# Patient Record
Sex: FEMALE | Race: ASIAN | URBAN - METROPOLITAN AREA
[De-identification: names, ages, dates, MRNs, and addresses within clinical notes are randomized per-mention and may not be internally consistent; named-entity substitution may affect disease eponyms.]

---

## 2022-06-28 ENCOUNTER — OFFICE VISIT (OUTPATIENT)
Dept: URBAN - METROPOLITAN AREA CLINIC 96 | Facility: CLINIC | Age: 27
End: 2022-06-28
Payer: COMMERCIAL

## 2022-06-28 VITALS
WEIGHT: 141 LBS | BODY MASS INDEX: 23.49 KG/M2 | TEMPERATURE: 98.2 F | SYSTOLIC BLOOD PRESSURE: 110 MMHG | HEIGHT: 65 IN | DIASTOLIC BLOOD PRESSURE: 69 MMHG | HEART RATE: 83 BPM

## 2022-06-28 DIAGNOSIS — K60.2 ANAL FISSURE: ICD-10-CM

## 2022-06-28 DIAGNOSIS — K59.09 CHANGE IN BOWEL MOVEMENTS INTERMITTENT CONSTIPATION. URGENCY IN THE MORNING.: ICD-10-CM

## 2022-06-28 DIAGNOSIS — K62.89 RECTAL PAIN: ICD-10-CM

## 2022-06-28 DIAGNOSIS — K62.5 RECTAL BLEEDING: ICD-10-CM

## 2022-06-28 PROCEDURE — 99204 OFFICE O/P NEW MOD 45 MIN: CPT | Performed by: INTERNAL MEDICINE

## 2022-06-28 PROCEDURE — 99244 OFF/OP CNSLTJ NEW/EST MOD 40: CPT | Performed by: INTERNAL MEDICINE

## 2022-06-28 RX ORDER — BACITRACIN ZINC, NEOMYCIN SULFATE, POLYMYXIN B SULFATE 500; 3.5; 1 [IU]/G; MG/G; [IU]/G
2 TABLETS AT BEDTIME AS NEEDED OINTMENT TOPICAL ONCE A DAY
Status: ACTIVE | COMMUNITY

## 2022-06-28 NOTE — PHYSICAL EXAM GASTROINTESTINAL
Abdomen , soft, nontender, nondistended , no guarding or rigidity , no masses palpable , normal bowel sounds , Liver and Spleen,  no hepatosplenomegaly , liver nontender, rectal exam with anal fissure in psterior midline, no mass, no stool in vault

## 2022-06-28 NOTE — HPI-TODAY'S VISIT:
Patient being seen in consultation as requested by Dr. Rosanne Berg for constipation and rectal bleeding.  A copy of this document will be sent to the requesting physician.  Patient reports post partum 4/2022 vaginal delivery uncomplicated healthy girl. Reports constipation with pregnancy and was taking Colace which helped. Post partum 1 week, did not have same effect with Colace and had painful hard BM. Also noted had BRBPR with sharp rectal pain. Advised to take Miralax daily which helped with constipation. However, still with occasional rectal pain after BM, no rectal bleeding today. No prior hx of anal fissure. No prior of hemorrhoids.   No chance currently pregnant. Breast feeding.

## 2022-07-06 ENCOUNTER — OFFICE VISIT (OUTPATIENT)
Dept: URBAN - METROPOLITAN AREA TELEHEALTH 2 | Facility: TELEHEALTH | Age: 27
End: 2022-07-06

## 2022-08-01 ENCOUNTER — TELEPHONE ENCOUNTER (OUTPATIENT)
Dept: URBAN - METROPOLITAN AREA CLINIC 96 | Facility: CLINIC | Age: 27
End: 2022-08-01

## 2022-08-01 ENCOUNTER — OFFICE VISIT (OUTPATIENT)
Dept: URBAN - METROPOLITAN AREA CLINIC 98 | Facility: CLINIC | Age: 27
End: 2022-08-01
Payer: COMMERCIAL

## 2022-08-01 ENCOUNTER — WEB ENCOUNTER (OUTPATIENT)
Dept: URBAN - METROPOLITAN AREA CLINIC 98 | Facility: CLINIC | Age: 27
End: 2022-08-01

## 2022-08-01 VITALS
TEMPERATURE: 98.1 F | WEIGHT: 136.6 LBS | BODY MASS INDEX: 22.76 KG/M2 | SYSTOLIC BLOOD PRESSURE: 113 MMHG | HEART RATE: 89 BPM | HEIGHT: 65 IN | DIASTOLIC BLOOD PRESSURE: 70 MMHG

## 2022-08-01 DIAGNOSIS — K62.5 RECTAL BLEEDING: ICD-10-CM

## 2022-08-01 DIAGNOSIS — K60.2 ANAL FISSURE: ICD-10-CM

## 2022-08-01 DIAGNOSIS — K59.00 CONSTIPATION, UNSPECIFIED CONSTIPATION TYPE: ICD-10-CM

## 2022-08-01 PROCEDURE — 99213 OFFICE O/P EST LOW 20 MIN: CPT

## 2022-08-01 RX ORDER — BACITRACIN ZINC, NEOMYCIN SULFATE, POLYMYXIN B SULFATE 500; 3.5; 1 [IU]/G; MG/G; [IU]/G
2 TABLETS AT BEDTIME AS NEEDED OINTMENT TOPICAL ONCE A DAY
Status: ON HOLD | COMMUNITY

## 2022-08-01 NOTE — HPI-TODAY'S VISIT:
Patient previously seen by Dr. Leon for post partum constipatipation.  She was previously seeing BRBPR with hard stools and associated pain.  Anal fissure was noted on exam in the office in 6/2022 She was provided with nitroglycerin ointment and advised Miralax daily  Patient states she has been taking the Miralax daily which does improve stools She endorses she was having soft stools and decided to decrease Miralax to half a cap. She then developed hard stools which resulted in sharp pain with episode of BRBPR x 1-2 episodes Patient then went back to 1 full cap of Miralax daily 3 days ago. Has had soft stools since with no sharp pain Is doing warm water soaks intermittently- 1-2 times in the last month Patient states she does occasionally have dark brown stools- typically when hard. She poses concern for color  Denies unintentional weight loss  Denies fever or chills

## 2022-08-01 NOTE — PHYSICAL EXAM GASTROINTESTINAL
Abdomen , soft, nontender, nondistended , no guarding or rigidity , no masses palpable , normal bowel sounds , Liver and Spleen , no hepatomegaly present , no hepatosplenomegaly , liver nontender , spleen not palpable , Rectum skin tag noted on rectum. No fissure present

## 2022-08-29 ENCOUNTER — TELEPHONE ENCOUNTER (OUTPATIENT)
Dept: URBAN - METROPOLITAN AREA CLINIC 98 | Facility: CLINIC | Age: 27
End: 2022-08-29

## 2022-08-30 ENCOUNTER — WEB ENCOUNTER (OUTPATIENT)
Dept: URBAN - METROPOLITAN AREA CLINIC 23 | Facility: CLINIC | Age: 27
End: 2022-08-30

## 2022-08-30 ENCOUNTER — OFFICE VISIT (OUTPATIENT)
Dept: URBAN - METROPOLITAN AREA CLINIC 23 | Facility: CLINIC | Age: 27
End: 2022-08-30
Payer: COMMERCIAL

## 2022-08-30 VITALS
HEIGHT: 65 IN | SYSTOLIC BLOOD PRESSURE: 111 MMHG | WEIGHT: 134.6 LBS | TEMPERATURE: 98.1 F | HEART RATE: 79 BPM | DIASTOLIC BLOOD PRESSURE: 72 MMHG | BODY MASS INDEX: 22.42 KG/M2

## 2022-08-30 DIAGNOSIS — K59.00 CONSTIPATION: ICD-10-CM

## 2022-08-30 DIAGNOSIS — K64.8 INTERNAL HEMORRHOIDS WITH COMPLICATION: ICD-10-CM

## 2022-08-30 DIAGNOSIS — K62.89 ANAL PAIN: ICD-10-CM

## 2022-08-30 DIAGNOSIS — K60.2 ANAL FISSURE: ICD-10-CM

## 2022-08-30 PROCEDURE — 46611 ANOSCOPY: CPT | Performed by: INTERNAL MEDICINE

## 2022-08-30 PROCEDURE — 99214 OFFICE O/P EST MOD 30 MIN: CPT | Performed by: INTERNAL MEDICINE

## 2022-08-30 RX ORDER — BACITRACIN ZINC, NEOMYCIN SULFATE, POLYMYXIN B SULFATE 500; 3.5; 1 [IU]/G; MG/G; [IU]/G
2 TABLETS AT BEDTIME AS NEEDED OINTMENT TOPICAL ONCE A DAY
Status: ON HOLD | COMMUNITY

## 2022-08-30 NOTE — EXAM-PHYSICAL EXAM
ANORECTAL EXAM AND ANOSCOPY:  Perineum: Appearance and structural integrity within normal limits, no rashes or skin lesions present.  Anus: No masses present, no anal bleeding present.  Anoscopy with grade 2 hemorrhoids in right anterior and right posterior columns.  Left lateral column has smaller, grade 1 hemorrhoids.  Rectum: On digital rectal exam, there is a mildly spastic sphincter tone and a small posterior anal fissure noted.  No rectal masses present.  Both the digital rectal exam and the anoscopy caused anal discomfort for the patient.  Hence hemorrhoid banding was deferred today.

## 2022-08-30 NOTE — HPI-TODAY'S VISIT:
- 28 yo female from [place] who returns for follow-up; patient was last seen in our GI offices by Dr. Júnior Samuel on 8/5/22 - She is still complaining of similar GI symptoms of anorectal pain which occurs after bowel movements.  During bowel movements she does not experience the pain. - She is using the Miralax once a day, and she is having 1-2 soft stools per day.  She no longer feels constipated. - She used nitroglycerin ointment twice a day for 1 month and she notes that it did help her

## 2022-09-01 ENCOUNTER — TELEPHONE ENCOUNTER (OUTPATIENT)
Dept: URBAN - METROPOLITAN AREA SURGERY CENTER 15 | Facility: SURGERY CENTER | Age: 27
End: 2022-09-01

## 2022-09-02 ENCOUNTER — DASHBOARD ENCOUNTERS (OUTPATIENT)
Age: 27
End: 2022-09-02

## 2022-09-02 PROBLEM — 14760008 CONSTIPATION: Status: ACTIVE | Noted: 2022-09-02

## 2022-09-19 ENCOUNTER — OFFICE VISIT (OUTPATIENT)
Dept: URBAN - METROPOLITAN AREA CLINIC 96 | Facility: CLINIC | Age: 27
End: 2022-09-19